# Patient Record
(demographics unavailable — no encounter records)

---

## 2025-04-18 NOTE — ASSESSMENT
[FreeTextEntry1] : Alert, oriented, well, pleasant.  comedonal acne. Papules chin perimenstrual. Acne  Differin Gel apply nightly, on a dry face, very thin layer. Side effects discussed with patient. non-comedogenic products.  Erythema cheeks. Pressured redness when warm. Rosacea avoidance. No inciting factors except spicy foods. start metronidazole gel twice per day for 2 months then once per day indefinitely. Increase dose as needed.  sun protection reviewed.